# Patient Record
Sex: FEMALE | Race: OTHER | URBAN - METROPOLITAN AREA
[De-identification: names, ages, dates, MRNs, and addresses within clinical notes are randomized per-mention and may not be internally consistent; named-entity substitution may affect disease eponyms.]

---

## 2021-02-11 NOTE — PATIENT DISCUSSION
"""Pterygium not currently interfering with vision and not bothersome to patient.  Recommend observation

## 2022-10-04 ENCOUNTER — PREPPED CHART (OUTPATIENT)
Dept: URBAN - METROPOLITAN AREA CLINIC 9 | Facility: CLINIC | Age: 77
End: 2022-10-04

## 2023-04-28 ENCOUNTER — NEW PATIENT (OUTPATIENT)
Dept: URBAN - METROPOLITAN AREA CLINIC 14 | Facility: CLINIC | Age: 78
End: 2023-04-28

## 2023-04-28 DIAGNOSIS — H25.13: ICD-10-CM

## 2023-04-28 PROCEDURE — 99204 OFFICE O/P NEW MOD 45 MIN: CPT

## 2023-04-28 PROCEDURE — 92136 OPHTHALMIC BIOMETRY: CPT

## 2023-04-28 ASSESSMENT — VISUAL ACUITY
OS_CC: 20/70+1
OS_BCVA: 20/50
OS_PH: 20/60
OD_PH: 20/70+1
OD_BCVA: 20/80
OD_CC: 20/200
OD_SC: J1-2
OS_SC: J1

## 2023-04-28 ASSESSMENT — KERATOMETRY
OS_K1POWER_DIOPTERS: 45.75
OD_K1POWER_DIOPTERS: 45.50
OD_AXISANGLE_DEGREES: 167
OS_K2POWER_DIOPTERS: 46.50
OS_AXISANGLE2_DEGREES: 110
OD_K2POWER_DIOPTERS: 46.50
OD_AXISANGLE2_DEGREES: 77
OS_AXISANGLE_DEGREES: 20

## 2023-04-28 ASSESSMENT — TONOMETRY
OS_IOP_MMHG: 19
OD_IOP_MMHG: 20

## 2023-05-08 ENCOUNTER — TECH ONLY (OUTPATIENT)
Dept: URBAN - METROPOLITAN AREA CLINIC 14 | Facility: CLINIC | Age: 78
End: 2023-05-08

## 2023-05-08 DIAGNOSIS — H35.3131: ICD-10-CM

## 2023-05-08 PROCEDURE — 92134 CPTRZ OPH DX IMG PST SGM RTA: CPT

## 2023-05-08 ASSESSMENT — KERATOMETRY
OD_AXISANGLE_DEGREES: 167
OS_AXISANGLE2_DEGREES: 110
OD_AXISANGLE2_DEGREES: 77
OS_K2POWER_DIOPTERS: 46.50
OS_K1POWER_DIOPTERS: 45.75
OD_K2POWER_DIOPTERS: 46.50
OD_K1POWER_DIOPTERS: 45.50
OS_AXISANGLE_DEGREES: 20

## 2023-05-17 ENCOUNTER — POST-OP (OUTPATIENT)
Dept: URBAN - METROPOLITAN AREA CLINIC 17 | Facility: CLINIC | Age: 78
End: 2023-05-17

## 2023-05-17 DIAGNOSIS — H52.7: ICD-10-CM

## 2023-05-17 DIAGNOSIS — Z96.1: ICD-10-CM

## 2023-05-17 PROCEDURE — 99024 POSTOP FOLLOW-UP VISIT: CPT

## 2023-05-17 ASSESSMENT — KERATOMETRY
OD_AXISANGLE_DEGREES: 167
OS_AXISANGLE2_DEGREES: 110
OD_K2POWER_DIOPTERS: 46.50
OD_AXISANGLE2_DEGREES: 77
OS_K2POWER_DIOPTERS: 46.50
OD_K1POWER_DIOPTERS: 45.50
OS_AXISANGLE_DEGREES: 20
OS_K1POWER_DIOPTERS: 45.75

## 2023-05-17 ASSESSMENT — TONOMETRY
OD_IOP_MMHG: 13
OD_IOP_MMHG: 25

## 2023-05-17 ASSESSMENT — VISUAL ACUITY: OD_SC: 20/40+1

## 2023-05-25 ENCOUNTER — POST OP/EVAL OF SECOND EYE (OUTPATIENT)
Dept: URBAN - METROPOLITAN AREA CLINIC 17 | Facility: CLINIC | Age: 78
End: 2023-05-25

## 2023-05-25 DIAGNOSIS — H25.12: ICD-10-CM

## 2023-05-25 DIAGNOSIS — Z96.1: ICD-10-CM

## 2023-05-25 DIAGNOSIS — H52.7: ICD-10-CM

## 2023-05-25 PROCEDURE — 99024 POSTOP FOLLOW-UP VISIT: CPT

## 2023-05-25 ASSESSMENT — KERATOMETRY
OD_K2POWER_DIOPTERS: 46.50
OS_K1POWER_DIOPTERS: 45.75
OD_K1POWER_DIOPTERS: 45.50
OS_AXISANGLE_DEGREES: 20
OS_K2POWER_DIOPTERS: 46.50
OD_AXISANGLE_DEGREES: 167
OD_AXISANGLE2_DEGREES: 77
OS_AXISANGLE2_DEGREES: 110

## 2023-05-25 ASSESSMENT — VISUAL ACUITY: OS_SC: 20/30

## 2023-05-25 ASSESSMENT — TONOMETRY: OS_IOP_MMHG: 14
